# Patient Record
Sex: MALE | Race: BLACK OR AFRICAN AMERICAN | NOT HISPANIC OR LATINO | Employment: UNEMPLOYED | URBAN - METROPOLITAN AREA
[De-identification: names, ages, dates, MRNs, and addresses within clinical notes are randomized per-mention and may not be internally consistent; named-entity substitution may affect disease eponyms.]

---

## 2017-02-24 ENCOUNTER — COMMUNICATION - HEALTHEAST (OUTPATIENT)
Dept: SCHEDULING | Facility: CLINIC | Age: 37
End: 2017-02-24

## 2019-06-24 ENCOUNTER — MEDICAL CORRESPONDENCE (OUTPATIENT)
Dept: BEHAVIORAL HEALTH | Facility: CLINIC | Age: 39
End: 2019-06-24

## 2019-06-27 ENCOUNTER — HOSPITAL ENCOUNTER (OUTPATIENT)
Dept: BEHAVIORAL HEALTH | Facility: CLINIC | Age: 39
Discharge: HOME | End: 2019-06-27
Attending: SOCIAL WORKER | Admitting: SOCIAL WORKER
Payer: COMMERCIAL

## 2019-06-27 PROCEDURE — 90791 PSYCH DIAGNOSTIC EVALUATION: CPT

## 2019-06-27 ASSESSMENT — ANXIETY QUESTIONNAIRES
GAD7 TOTAL SCORE: 0
6. BECOMING EASILY ANNOYED OR IRRITABLE: NOT AT ALL
5. BEING SO RESTLESS THAT IT IS HARD TO SIT STILL: NOT AT ALL
IF YOU CHECKED OFF ANY PROBLEMS ON THIS QUESTIONNAIRE, HOW DIFFICULT HAVE THESE PROBLEMS MADE IT FOR YOU TO DO YOUR WORK, TAKE CARE OF THINGS AT HOME, OR GET ALONG WITH OTHER PEOPLE: NOT DIFFICULT AT ALL
7. FEELING AFRAID AS IF SOMETHING AWFUL MIGHT HAPPEN: NOT AT ALL
4. TROUBLE RELAXING: NOT AT ALL
2. NOT BEING ABLE TO STOP OR CONTROL WORRYING: NOT AT ALL
1. FEELING NERVOUS, ANXIOUS, OR ON EDGE: NOT AT ALL
3. WORRYING TOO MUCH ABOUT DIFFERENT THINGS: NOT AT ALL

## 2019-06-27 ASSESSMENT — PATIENT HEALTH QUESTIONNAIRE - PHQ9: SUM OF ALL RESPONSES TO PHQ QUESTIONS 1-9: 1

## 2019-06-27 ASSESSMENT — PAIN SCALES - GENERAL: PAINLEVEL: NO PAIN (0)

## 2019-06-27 ASSESSMENT — MIFFLIN-ST. JEOR: SCORE: 2233.51

## 2019-06-27 NOTE — PROGRESS NOTES
Gambling Evaluation   Background Information     Date of Assessment:  2019   :  Bryanna Rutherford MS, [facility], [facility]GC-II   Referral Source:  Rule 82   Patient Name:   Tracie Espitia   YOB: 1980 Age:  39 year old Gender:  male   Current Address:   [address] [place] [address]     Home Phone #:     Cell Phone #:  [phone]     Relationship Status   Ethnicity  Black/   Client's Primary Language:  English   E-mail address  [email or web address]   Do you give permission to give your cell # to the group?  N/A   Emergency :    Afshan Espitia, Wife   Emergency Contact Phone #:    [phone]     Do you have learning disabilities or require special accommodations?    No     What prompted you to come for a gambling assessment today?     Patient was arrested for transporting drugs to pay off his gambling debt.  Patient is very clear that he almost lost everything due to his gambling and is doing everything to make amends.  He has recently began working as a  and it is clear he is intelligent and could benefit from education to start a career where he does not need to use his body as he is aware he can't do [place] for much longer due to his age.  Patient reports he owed about $4000 in gambling debt but the people who lent it to him wanted twice that amount back.  He states he has not gambled in over a year.  His arrest date was 18.     Have you been diagnosed with a gambling problem?    No     Have you been diagnosed with alcohol or drug related problems?    No         DIMENSION I - Acute Intoxication /Withdrawal Potential     Gambling History    Stage Age Games Played How Often Average Amt Bet Big Wins/Losses Consequences     Early   10 -17   Horse Racing  cards   Weekly  rarely   $2  coins   None   Went to the race track with dad weekly until he .     Middle     18 - 35   Slots  Lottery  Horse racing   Bi-monthly  Rarely  yearly   $60  $4  $20   Won  $500 on 10 cent bet -    Late 20's big win.  Amadeo once a year for 3-4 years.       Late     35- 37   Slots   2 x week   $200   Lost $700 on one time   Started borrowing money from people when he was losing, didn't want to take from the house.  His friend had a drug problem, he knew a tim to borrow money from, then found out the tim was threatening to kill him due to owing him twice what he borrowed, $5000 - $10,000.  He was arrested trying to move product for him.       Last Bet:  Don't remember.    Substance Use History             X = Primary Drug Used   Age of First Use Most Recent Pattern of Use and Duration   Need enough information to show pattern (both frequency and amounts) and to show tolerance for each chemical that has a diagnosis   Date of last use and time, if needed   Withdrawal Potential? Requiring special care Method of use  (oral, smoked, snort, IV, etc)      Alcohol     18  Reports he doesn't remember the last time he drank           Marijuana/  Hashish   15  Reports he smoked as a teenager, and smoked a year or two ago         Cocaine/Crack     22  reported snorting with heavy use 10 years ago, does not remember the last time he used.         Meth/  Amphetamines   N/A           Heroin     N/A           Other Opiates/  Synthetics   N/A           Inhalants     N/A           Benzodiazepines     N/A           Hallucinogens     N/A           Barbiturates/  Sedatives/  Hypnotics N/A           Over-the-Counter Drugs   N/A           Other     N/A           Nicotine     N/A          Any current physical discomfort or withdrawal concerns?  No    Have you ever been to detox? No    How many times? NA    Have you had any of the following chemical dependency withdrawal symptoms?  Past 12 months Recent (past 30 days)   None None     Have you had any of the following gambling withdrawal symptoms?  Past 12 months Recent (past 30 days)   None None     Dimension I Ratings   Acute intoxication/Withdrawal potential -  The placing authority must use the criteria in Dimension I to determine a client s acute intoxication and withdrawal potential.    RISK DESCRIPTIONS - Severity ratin Client displays full functioning with good ability to tolerate and cope with withdrawal discomfort. No signs or symptoms of intoxication or withdrawal or resolving signs or symptoms.    REASONS SEVERITY WAS ASSIGNED (What about the amount of the person s use and date of most recent use and history of withdrawal problems suggests the potential of withdrawal symptoms requiring professional assistance? )     Patient is not under the influence or having withdrawal symptoms at this time.         DIMENSION II - Biomedical Complications and Conditions     Do you have any current health/medical conditions?(Include any infectious diseases, allergies, or chronic or acute pain, history of chronic conditions)       No    List current medication(s) including over-the-counter or herbal supplements--including pain management:     NA    Do you follow current medical recommendations/take medications as prescribed?     Yes    Do you have any dental problems?    No    Are you up to date on your medical, dental and eye appointments?     Yes    Are you or have you ever been prescribed: Abilify (Aripiprazole), Requip (ropinirole) Zelapar (selegiline hydrochloride), Comtan (entacapone) Mirapex (pramipexole)?     No    Do you have a health care provider?    The patient does not have a PCP at this time.    Do you need a referral to have a follow up with a primary care physician?    No.    Has a health care provider/healer ever recommended that you reduce or quit alcohol/drug use/gambling?     No    Are you pregnant?     No    Have you had any injuries, assaults/violence towards you, accidents, health related issues, overdose(s) or hospitalizations related to your use of alcohol or other drugs:     No    Are you on a special diet?    No    Do you have any concerns regarding  "your nutritional status?    No    Have you had any appetite changes in the last 3 months?    No    Have you had weight loss or weight gain of more than 10 lbs in the last 3 months?   If patient gained or lost more than 10 lbs, then refer to program RN / attending Physician for assessment.    Yes, explain: more active    Was the patient informed of BMI?  Yes    Above,  Referral to primary care physician and General nutrition education    Do you have any dental problems?    No    Do you have any pain control problems?     No    How is your pain managed?     NA    Do you have any concerns/problems with short or long-term memory?     No    Have you ever neglected your health because of your gambling/alcohol/drug use?     No    Have you ever been admitted to the Emergency Room as a result of your gambling/alcohol/drug use?     No    Dimension II Ratings   Biomedical Conditions and Complications - The placing authority must use the criteria in Dimension II to determine a client s biomedical conditions and complications.   RISK DESCRIPTIONS - Severity ratin Client displays full functioning with good ability to cope with physical discomfort.    REASONS SEVERITY WAS ASSIGNED (What physical/medical problems does this person have that would inhibit his or her ability to participate in treatment? What issues does he or she have that require assistance to address?)    Patient does not report any health conditions or medications for biomedical conditions at this time.  Patient is able to seek medical attention as needed.         DIMENSION III - Emotional, Behavioral, Cognitive Conditions and Complications     The patient grew up in:     Born [place],  parents.  Dad passed away in 7th grade.  Mom did not remarry.  He is the youngest of mom.  Full history is in the PSI.     My childhood could be best described as:     \"good\", dad gambled, drank, but no consequences.     Who raised you? (parents, grandparents, adoptive " parents, step-parents, etc.)    Both Parents     Growing up, the patient was supported by:     Baby of the family, all the family supported her.     Siblings:     Siblings raised with in the family home:  Kyle, age 49.  [place], 47. [place], 46. , age .  Umesh 41.       Family CD/Gambling/Mental Health history:     Dad drank, but no addiction and gambled.  Mom side, not that he is ware of.       Have you ever been emotionally or verbally abused?            No    Have you ever emotionally or verbally abused someone else?        No    Have you ever been physically abused?            No    Have you ever intentionally hurt yourself by hitting, cutting or burning yourself?            No    Have you ever physically abused someone else?            No    Do you have any thoughts of harming anyone?            No    Have you ever been sexually abused?            No    Have you ever sexually abused someone else?            No    Has anyone ever complained about your sexual behavior?            No    Have you ever visited pornographic sites on the internet?            Yes.  How often: rare.  Do you or anyone else think this is a problem for you: No    Have you ever used food in a way that was harmful to you?            No    Have you ever starved yourself?            No    Have you ever tried to control your weight?            No    Have you ever induced vomiting after eating?            No    Have you ever been diagnosed with a clinical mental health disorder?            Yes, please explain: depression, five years ago.      Have you ever been prescribed any medications for your mental health?            Yes.  When were you prescribed these medications?  He didn't like it and didn't take it for long.  Unsure if it was Abilify.    What medications are you currently taking?            None    Are you currently seeing a mental health therapist?            No    Have you ever had a suicide attempt?    No    Have you  "ever had any psychiatric hospitalizations?            No    Have you ever been diagnosed with any learning disabilities?            No    Have you ever been in the ?    No    Highest grade of school completed:     Associate degree/vocational certificate    Describe your preferred learning style:      by reading, by hands-on practice and by watching someone else demonstrate    Are you currently in school?            No    What are your greatest personal strengths?            Per patient \"people person, good at customer service, technology, good work ethic\".    What do you value most in life?            Per patient \"family\".    GAIN Short Screener     1.)  When was the last time that you had significant problems...  A. with feeling very trapped, lonely, sad, blue, depressed or hopeless  about the future? 1+ years ago    B. with sleep trouble, such as bad dreams, sleeping restlessly, or falling  asleep during the day? 1+ years ago, sleep apnea    C. with feeling very anxious, nervous, tense, scared, panicked, or like  something bad was going to happen? Never    D. with becoming very distressed and upset when something reminded  you of the past? Never    E. with thinking about ending your life or committing suicide? Never    2.)  When was the last time that you did the following things two or more times?  A. Lied or conned to get things you wanted or to avoid having to do  something? 1+ years ago    B. Had a hard time paying attention at school, work, or home? Never    C. Had a hard time listening to instructions at school, work, or home? Never    D. Were a bully or threatened other people? Never    E. Started physical fights with other people? Never    Note: These questions are from the Global Appraisal of Individual Needs--Short Screener. Any item marked  past month  or  2 to 12 months ago  will be scored with a severity rating of at least 2.     For each item that has occurred in the past month or past year " ask follow up questions to determine how often the person has felt this way or has the behavior occurred? How recently? How has it affected their daily living? And, whether they were using or in withdrawal at the time?    If the person has answered item 1E with  in the past year  or  the past month , ask about frequency and history of suicide in the family or someone close and whether they were under the influence.     NA    Has anyone close to you, a family member, a friend or a significant other attempted or completed a suicide?     No    If the person answered item 1E  in the past month  ask about intent, plan, means and access and any other follow-up information to determine imminent risk. Document any actions taken to intervene on any identified imminent risk.      NA    Dimension III Ratings   Emotional/Behavioral/Cognitive - The placing authority must use the criteria in Dimension III to determine a client s emotional, behavioral, and cognitive conditions and complications.   RISK DESCRIPTIONS - Severity ratin Client has impulse control and coping skills. Client presents a mild to moderate risk of harm to self or others or displays symptoms of emotional, behavioral or cognitive problems. Client has a mental health diagnosis and is stable. Client functions adequately in significant life areas.    REASONS SEVERITY WAS ASSIGNED - What current issues might with thinking, feelings or behavior pose barriers to participation in a treatment program? What coping skills or other assets does the person have to offset those issues? Are these problems that can be initially accommodated by a treatment provider? If not, what specialized skills or attributes must a provider have?    The patient has never had treatment for problem gambling.  Patient appears to lack impulse control and coping skills.  The patient does not report any recent history of mental illness and does not take any medication. Patient s PHQ-9 score  "was 1 out of 27, indicating Minimal depression. Patient s MARY JO-7 score was 0 out of 21, indicating no reported anxiety. Patient denied suicidal and self-injurious ideation and intent at this time. Patient denied suicide attempts in the past. Patient denied a history of trauma and/or abuse.          DIMENSION IV - Readiness for Change     How has your gambling affected relationships in your life?     Per patient \"made some relationships stronger, prior to it, my wife would say I was too perfect, put a chink in his armour\".    How has your gambling affected your finances?     Per patient \"FCI, lost job\".    What values has gambling affected in your life?     Per patient \"I think it is all still there\".    Has anyone expressed concern about your gambling?     Yes, please explain: wife    What changes are you willing to make relative to your gambling?     Per patient \"I think I've already made most of the changes, continue to work hard, get back to where I was everyday\".    How would you describe your current motivation to stop gambling?     Per patient \"my family\".      Dimension IV Ratings   Readiness for Change - The placing authority must use the criteria in Dimension IV to determine a client s readiness for change.   RISK DESCRIPTIONS - Severity ratin Client is cooperative, motivated, ready to change, admits problems, committed to change, and engaged in treatment as a responsible participant.    REASONS SEVERITY WAS ASSIGNED - (What information did the person provide that supports your assessment of his or her readiness to change? How aware is the person of problems caused by continued use? How willing is she or he to make changes? What does the person feel would be helpful? What has the person been able to do without help?)      The patient did appear to be willing to enter the Intensive Outpatient Problem Gambling Program if recommended.  Patient is both externally and internally motivated.  Patient expressed " "concerns about the consequences that will be faced now and in the future with continued gambling.  Patient seems to be in the action stage of change.  Patient expressed a desire to from abstain gambling. He reports he was going to lose everything if he continued to joya.  He is court ordered not to joya and states he has not gambled since the arrest. Patient is aware how gambling has impacted life for themselves and for those around them.         DIMENSION V - Relapse, Continued Use, and Continued Problem Potential     What triggers or situations increase your likelihood to joya?    Per patient \"I can't see a trigger\".    How often do you use more alcohol and drugs than you planned?    NA    How often do you joya with more money than you planned?    NA    Have you ever tried to control, cut down or quit your gambling addiction?    No    Have you ever tried to control your use of alcohol/drugs?    No    What did you do to stop gambling?    Was arrested.    What was your longest period of abstinence from gambling?    currently    What was your longest period of abstinence from alcohol/drugs?    Currently    History of Gambling/CD treatments  Where  (Program) When  (Year) Treatment   (CD/Gamb) Completed  (Yes/No) Length of time GA or CD Free     No history                   If you had prior GA or CD treatment, What was helpful?  What was not helpful?    NA    Please identify which self-help groups you have attended and how often you attended (Gamblers Anonymous, AA, NA, etc.)?    NA    How would you rate your urges to joya today (0-10, 0 being no urge at all)? 0    How would you rate your average urges for the last 30 days (0-10, 0 being no urge at all)? 0    What has helped you reduce your urges to joya?    NA      Dimension V Ratings   Relapse/Continued Use/Continued problem potential - The placing authority must use the criteria in Dimension V to determine a client s relapse, continued use, and continued " "problem potential.   RISK DESCRIPTIONS - Severity ratin (A) Client has minimal recognition and understanding of relapse and recidivism issues and displays moderate vulnerability for further substance use or mental health problems.     REASONS SEVERITY WAS ASSIGNED - (What information did the person provide that indicates his or her understanding of relapse issues? What about the person s experience indicates how prone he or she is to relapse? What coping skills does the person have that decrease relapse potential?)      Patient has limited understanding of addiction and relapse potential.  Pt has never attended GA groups. Patient has never been in treatment before and lacks coping skills to prevent relapse.   Patient lacks knowledge of the addiction cycle. He lacks insight into his personal relapse process along with warning signs and triggers.  Patient lacks impulse control, gambling free coping skills, and long-term maintenance skills. The patient is at risk for increased consequences if he does not remain gambling free.  Patient at this time reports he has not gambled in the past year.           DIMENSION VI - Recovery Environment   Are you currently working or in school? Please explain.     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     How has gambling affected your work?    Lost his job, went to retirement    How would you describe your current financial status?  Just making it    Are you are having problems with unpaid bills, bankruptcy, IRS problems, etc.?    Yes, please explain: Unpaid bills, but just started working two weeks ago.    What is your approximate present gambling debt?    Did not pay off the \"loan shark\".  But with the court issues, he believes the issue is no longer an active threat.    Describe a typical week for you i.e. work, leisure activities, socializing, etc.     Netflix, chill with grand babies, work    What percentage of time do you joya alone?  " With others?     Last time, was mostly alone, before the addiction, it was social and with other people    Are you currently in a significant relationship?     Yes.  4B. How long? Afshan, 19 years,  17    Sexual Orientation:     Heterosexual    Who do you live with?      Wife and kids    Do you have any children?      Yes, please explain: Wing, age 25.  Wade, age 23.  Issa age 19. Kiki, age 17.  Guille age 12.    How many times have you been ?    Once    Describe your current support system i.e. family, friends, sponsor, therapist, etc.?      Wife, brothers, mom.    Do you have any past or present legal charges?    Yes, please explain: currently on probation, following all recommendations of the courts.    Do you have any obstacles that would prevent you from participating in treatment?    No    Do you pray, meditate, do yoga, attend AA/NA/GA or other spiritual practices?    No    How does your spirituality impact your recovery?      Not applicable    Please list any other problems, issues or concerns that could affect your recovery if not addressed?      NA    Dimension VI Ratings   Recovery environment - The placing authority must use the criteria in Dimension VI to determine a client s recovery environment.   RISK DESCRIPTIONS - Severity ratin Client is engaged in structured, meaningful activity and has a supportive significant other, family, and living environment.    REASONS SEVERITY WAS ASSIGNED - (What support does the person have for making changes? What structure/stability does the person have in his or her daily life that will increase the likelihood that changes can be sustained? What problems exist in the person s environment that will jeopardize getting/staying clean and sober?)     The patient lives with his wife and children, they are very supportive of his not gambling.  Patient began working as a .  Patient spends free time with his family.  Patient is court ordered  "to follow recommendations of this assessment.           Collateral Contacts     Name:    Afshan Espitia   Relationship:    Wife   Phone Number:    [phone] Releases:    Yes     Patient came to the assessment with his wife, after he signed a release she was interviewed by counselor.  Afshan reported that her husbands gambling started 2 years ago.  After he had borrowed money to joya, she found out the people were threatening their lives so that was why he had agreed to do the drug run to pay off the debt.  She reports that he does not drink or do drugs. She is aware that his probation states he can't joya.  They have been together 19 years,  17.      Collateral Contacts     Name:      Juan David Turner   Relationship:       Phone Number:    [phone]   Releases:    Yes     Phone call with .  He reported that when the patient was arrested he reported he had the drugs in order to pay off gambling debt.  He was found with \"a lot of drugs\" with the intent to sell.  The only discrepancy in the report was that he had reported to the court that he had don cocaine at the time of the arrest, but did not report any use for 10 years on the current assessment.  Patient was not tested for drugs in his system.  It seems that the patient's gambling in the past had impacted his housing, vehicle, and employment.           Summary of Gambling Disorder Symptoms     Needs to joya with increasing amount of money in order to achieve desired excitement.  Has made repeated unsuccessful efforts to cut down or stop gambling.  Is often preoccupied with gambling (e.g. having persistent thoughts of reliving past gambling experiences, handicapping or planning the next venture, thinking of ways to get money with which to joya).  Lies to conceal the extent of involvement with gambling.  Has jeopardized or lost a significant relationship, job, educational or career opportunity because of gambling.  Relies " "on others to provide money to relieve desperate financial situations caused by gambling (\"a bailout\")    Specify if:   Episodic:  Meeting diagnostic at more than one time point, with symptoms subsiding between periods of gambling disorder for at least several months.    Persistent:  Experiencing continuous symptoms, to meet diagnostic criteria for multiple years.    Specify if:   In early remission:  After full criteria for alcohol/drug use disorder were previously met, none of the criteria for alcohol/drug use disorder have been met for at least 3 months but for less than 12 months (with the exception that Criterion A4,  Craving or a strong desire or urge to use alcohol/drug  may be met).     In sustained remission:   After full criteria for alcohol use disorder were previously met, none of the criteria for alcohol/drug use disorder have been met at any time during a period of 12 months or longer (with the exception that Criterion A4,  Craving or strong desire or urge to use alcohol/drug  may be met).   Specify if:   This additional specifier is used if the individual is in an environment where access to alcohol is restricted.    Mild: Presence of 4-5 symptoms    Moderate: Presence of 6-7 symptoms    Severe: Presence of 8 or more symptoms      Summary of Substance Abuse Disorder Symptoms     A problematic pattern of alcohol/drug use leading to clinically significant impairment or distress, as manifested by at least two of the following, occurring within a 12-month period:    NA    Specify if:   In early remission:  After full criteria for alcohol/drug use disorder were previously met, none of the criteria for alcohol/drug use disorder have been met for at least 3 months but for less than 12 months (with the exception that Criterion A4,  Craving or a strong desire or urge to use alcohol/drug  may be met).     In sustained remission:   After full criteria for alcohol use disorder were previously met, none of the " criteria for alcohol/drug use disorder have been met at any time during a period of 12 months or longer (with the exception that Criterion A4,  Craving or strong desire or urge to use alcohol/drug  may be met).   Specify if:   This additional specifier is used if the individual is in an environment where access to alcohol is restricted.    Mild: Presence of 2-3 symptoms    Moderate: Presence of 4-5 symptoms    Severe: Presence of 6 or more symptoms    SOGS: 8 DSM-5: 0 CAGE-AID: 0 MARY JO-7: 0 PHQ-9: 1     Mental Status Assessment    Physical Appearance/Attire:  Appears stated age  Hygiene:  well groomed  Eye Contact:  at examiner  Speech:  regular  Speech Volume:  regular  Speech Quality: fluid  Cognitive/Perceptual:  reality based  Cognition:  memory intact   Judgment:  intact  Insight:  intact  Orientation:  time, place, person and situation  Thought:  logical   Hallucinations:  none  General Behavioral Tone:  cooperative  Psychomotor Activity:  no problem noted  Gait:  no problem  Mood:  normal  Affect:  congruence/appropriate      Vulnerable Adult Checklist for OUTPATIENTS     1.  Do you have a physical, emotional or mental infirmity or dysfunction?       No    2.  Does this issue impair your ability to provide for your own care without help, including providing yourself with food, shelter, clothing, healthcare or supervision?       No    3.  Because of this issue, I need assistance to protect myself from maltreatment by others.      No    Based on the above information:    This person is not a functional Vulnerable Adult according to [place] Statute 626.5572 subdivision 21.      Category Severity (ICD-10 Code / DSM 5 Code)   Gambling Disorder Severe  (F63.0) (312.31), sustained remission   Alcohol Use Disorder NA   Cannabis Use Disorder NA   Hallucinogen Use Disorder NA   Inhalant Use Disorder NA   Opioid Use Disorder NA   Sedative, Hypnotic, or Anxiolytic Use Disorder NA   Stimulant Related Disorder NA   Tobacco  Use Disorder NA   Other (or unknown) Substance Use Disorder NA     [place]-Suicide Severity Rating Scale   Suicide Ideation   1.) Have you ever wished you were dead or that you could go to sleep and not wake up?     Lifetime:  No Past Month:  No   2.) Have you actually had any thoughts of killing yourself?   Lifetime:  No Past Month:  No   3.) Have you been thinking about how you might do this?     Lifetime:  NA Past Month:  NA   4.) Have you had these thoughts and had some intention of acting on them?     Lifetime:  NA Past Month:  NA   5.) Have you started to work out the details of how to kill yourself?   Lifetime:  NA Past Month:  NA   6.) Do you intend to carry out this plan?      Lifetime:  NA Past Month:  NA   Intensity of Ideation   Intensity of ideation (1 being least severe, 5 being most severe):     Lifetime:  NA Past Month:  NA   How often do you have these thoughts?  N/A      When you have the thoughts how long do they last?  N/A   Can you stop thinking about killing yourself or wanting to die if you want to?  N/A   Are there things - anyone or anything (i.e. family, [place], pain of death) that stopped you from wanting to die or acting on thoughts of suicide?  Does not apply   What sort of reasons did you have for thinking about wanting to die or killing yourself (ie end pain, stop how you were feeling, get attention or reaction, revenge)?  Does not apply   Suicidal Behavior   (Suicide Attempt) - Have you made a suicide attempt?     Lifetime:  No Past Month:  No   Have you engaged in self-harm (non-suicidal self-injury)?  No   (Interrupted Attempt) - Has there been a time when you started to do something to end your life but someone or something stopped you before you actually did anything?  No   (Aborted or Self-Interrupted Attempt) - Has there been a time when you started to do something to try to end your life but you stopped yourself before you actually did anything?  No   (Preparatory Acts of  "Behavior) - Have you taken any steps towards making suicide attempt or preparing to kill yourself (such as collecting pills, getting a gun, giving valuables away or writing a suicide note)?  No   Actual Lethality/Medical Damage:  NA     2008  [facility]  Used with permission by Marisol Tim, PhD.       Guide to C-SSRS Risk Ratings   NO IDEATION:  with no active thoughts IDEATION: with a wish to die. IDEATION: with active thoughts. Risk Ratings   If Yes No No 0 - Very Low Risk   If NA Yes No 1 - Low Risk   If NA Yes Yes 2 - Low/moderate risk   IDEATION: associated thoughts of methods without intent or plan INTENT: Intent to follow through on suicide PLAN: Plan to follow through on suicide Risk Ratings cont...   If Yes No No 3 - Moderate Risk   If Yes Yes No 4 - High Risk   If Yes Yes Yes 5 - High Risk   The patient's ADDITIONAL RISK FACTORS and lack of PROTECTIVE FACTORS may increase their overall suicide risk ratings.     Additional Risk Factors:    No additional risk factors   Protective Factors:    Having people in his/her life that would prevent the patient from considering a suicide attempt (i.e. young children, spouse, parents, etc.)     An absence of mental health issues or stable and well treated mental health issues     An absence of chronic health problems or stable and well treated chronic health issues     Having easy access to supportive family members     Having cultural, Taoist or spiritual beliefs that discourage suicide     Having restricted access to highly lethal means of suicide     Risk Status   0. - Very Low Risk:  Evaluation Counselors:  Document in Epic / SBAR to counselor \"Very Low Risk\".      Treatment Counselors:  Reassess upon admission as applicable, assess weekly in progress notes under Dimension 3 and summarize in Discharge / Treatment summary under Dimension 3.   Additional information to support suicide risk rating: There was no additional " "information to provide at this time.     Summary of ASAM Placement Criteria:   I.) Intoxication and Withdrawal: 0   II.) Biomedical:  0   III.) Emotional and Behavioral:  1   IV.) Readiness to Change:  0   V.) Relapse Potential: 2   VI.) Recovery Environmental: 0     Evaluation Summary and Plan   's Recommendation    Abstain from all forms of gambling, including \"free\" games , and online/nury games.  Refrain from entering all types of justice establishments.  Remain law abiding and follow all recommendations of the courts/PO.  If any concerns arise regarding possible gambling or unexplained financial issues, return to [place] for a follow up assessment and updated recommendations.        Initial problem list:    The patient lacks relapse prevention skills  The patient has current legal issues    Strengths:  Family support  Employment  Lack of  mental health & physical health concerns  Intelligent  Functional communication skills      "

## 2019-06-28 ASSESSMENT — ANXIETY QUESTIONNAIRES: GAD7 TOTAL SCORE: 0

## 2019-07-01 VITALS
WEIGHT: 271.8 LBS | HEIGHT: 75 IN | HEART RATE: 65 BPM | DIASTOLIC BLOOD PRESSURE: 62 MMHG | BODY MASS INDEX: 33.8 KG/M2 | SYSTOLIC BLOOD PRESSURE: 122 MMHG

## 2019-07-01 SDOH — HEALTH STABILITY: MENTAL HEALTH: HOW OFTEN DO YOU HAVE A DRINK CONTAINING ALCOHOL?: NEVER

## 2019-07-01 SDOH — HEALTH STABILITY: MENTAL HEALTH: HOW OFTEN DO YOU HAVE 6 OR MORE DRINKS ON ONE OCCASION?: NEVER

## 2019-12-17 ENCOUNTER — CARE COORDINATION (OUTPATIENT)
Dept: ADDICTION MEDICINE | Facility: CLINIC | Age: 39
End: 2019-12-17

## 2019-12-17 NOTE — PROGRESS NOTES
YAMILET spoke again with Amanda at [facility]. Pt will need to have appt changed to Thursday January 2nd at 10:30am with MD Latif and 11:30am with this YAMILET.     Anjali Galicia [facility]   Ambulatory Care Coordination [facility]  Phone: [phone]  Email: [email or web address]  12/17/2019 2:15 PM

## 2019-12-17 NOTE — PROGRESS NOTES
YAMILET Alba spoke with Amanda at [facility] inpatient treatment program. YAMILET received referral for pt to participate in co-coordinated program through  [facility].     Pt is scheduled got intake with YAMILET Alba and MD Latif on Thursday 12/26/19 at 2:30pm and 3:330pm respectively.     LEROY Romero   Ambulatory Care Coordination [facility]  Phone: [phone]  Email: [email or web address]  12/17/2019 11:23 AM

## 2019-12-24 ENCOUNTER — COMMUNICATION - HEALTHEAST (OUTPATIENT)
Dept: BEHAVIORAL HEALTH | Facility: CLINIC | Age: 39
End: 2019-12-24

## 2019-12-26 ENCOUNTER — OFFICE VISIT - HEALTHEAST (OUTPATIENT)
Dept: BEHAVIORAL HEALTH | Facility: CLINIC | Age: 39
End: 2019-12-26

## 2019-12-26 DIAGNOSIS — F11.20 SEVERE OPIOID USE DISORDER (H): ICD-10-CM

## 2019-12-26 DIAGNOSIS — F43.22 ADJUSTMENT DISORDER WITH ANXIETY: ICD-10-CM

## 2019-12-26 LAB
AMPHETAMINES UR QL SCN: ABNORMAL
BARBITURATES UR QL: ABNORMAL
BENZODIAZ UR QL: ABNORMAL
BUPRENORPHINE QUAL URINE LHE: ABNORMAL
CANNABINOIDS UR QL SCN: ABNORMAL
COCAINE UR QL: ABNORMAL
CREAT UR-MCNC: 196.6 MG/DL
CREAT UR-MCNC: 200.6 MG/DL
METHADONE UR QL SCN: ABNORMAL
OPIATES UR QL SCN: ABNORMAL
OXYCODONE UR QL: ABNORMAL
PCP UR QL SCN: ABNORMAL

## 2019-12-26 RX ORDER — GABAPENTIN 300 MG/1
300 CAPSULE ORAL 3 TIMES DAILY
Qty: 90 CAPSULE | Refills: 0 | Status: SHIPPED | OUTPATIENT
Start: 2019-12-26

## 2019-12-26 RX ORDER — BUPRENORPHINE AND NALOXONE 8; 2 MG/1; MG/1
1.5 FILM, SOLUBLE BUCCAL; SUBLINGUAL 2 TIMES DAILY
Qty: 22 FILM | Refills: 0 | Status: SHIPPED | OUTPATIENT
Start: 2019-12-26

## 2019-12-26 ASSESSMENT — ANXIETY QUESTIONNAIRES
7. FEELING AFRAID AS IF SOMETHING AWFUL MIGHT HAPPEN: NOT AT ALL
3. WORRYING TOO MUCH ABOUT DIFFERENT THINGS: SEVERAL DAYS
5. BEING SO RESTLESS THAT IT IS HARD TO SIT STILL: SEVERAL DAYS
6. BECOMING EASILY ANNOYED OR IRRITABLE: MORE THAN HALF THE DAYS
GAD7 TOTAL SCORE: 8
2. NOT BEING ABLE TO STOP OR CONTROL WORRYING: SEVERAL DAYS
4. TROUBLE RELAXING: SEVERAL DAYS
1. FEELING NERVOUS, ANXIOUS, OR ON EDGE: MORE THAN HALF THE DAYS

## 2019-12-26 ASSESSMENT — PATIENT HEALTH QUESTIONNAIRE - PHQ9: SUM OF ALL RESPONSES TO PHQ QUESTIONS 1-9: 10

## 2019-12-26 ASSESSMENT — MIFFLIN-ST. JEOR: SCORE: 2283.38

## 2019-12-30 LAB
ETHYL GLUCURONIDE UR CFM-MCNC: <100 NG/ML
ETHYL SULFATE UR CFM-MCNC: <100 NG/ML

## 2020-01-02 ENCOUNTER — PATIENT OUTREACH (OUTPATIENT)
Dept: ADDICTION MEDICINE | Facility: CLINIC | Age: 40
End: 2020-01-02

## 2020-01-02 NOTE — PROGRESS NOTES
Clinic Care Coordination Contact 1/2/20  Care Team Conversations    SW left vm with pt. Pt had appt with MD Latif and this SW for intake into co-coordinated care program.     Pt's appt was for 10:30am and 11:30am. SW left vm asking pt to return call to reschedule or inform this SW if they will seek other programming.     SW removed self from pt's care team list until further notice.    LEROY Romero   Ambulatory Care Coordination [facility]  Phone: [phone]  Email: [email or web address]  1/2/2020 11:01 AM

## 2021-05-26 ASSESSMENT — PATIENT HEALTH QUESTIONNAIRE - PHQ9: SUM OF ALL RESPONSES TO PHQ QUESTIONS 1-9: 10

## 2021-05-28 ASSESSMENT — ANXIETY QUESTIONNAIRES: GAD7 TOTAL SCORE: 8

## 2021-06-04 VITALS
HEIGHT: 75 IN | WEIGHT: 285 LBS | BODY MASS INDEX: 35.43 KG/M2 | SYSTOLIC BLOOD PRESSURE: 159 MMHG | HEART RATE: 96 BPM | DIASTOLIC BLOOD PRESSURE: 96 MMHG

## 2021-06-04 NOTE — TELEPHONE ENCOUNTER
Pt called and missed appt yesterday, thinking it was scheduled for 12/24 instead.  He was discharged from tx last week and is now out of suboxone.  He is wondering if Dr. Crews is able to refill this despite not yet seeing him.  Appt is rescheduled for 12/26.

## 2021-06-16 PROBLEM — F11.20 SEVERE OPIOID USE DISORDER (H): Status: ACTIVE | Noted: 2019-11-22
